# Patient Record
Sex: FEMALE | Race: BLACK OR AFRICAN AMERICAN | NOT HISPANIC OR LATINO | Employment: STUDENT | URBAN - METROPOLITAN AREA
[De-identification: names, ages, dates, MRNs, and addresses within clinical notes are randomized per-mention and may not be internally consistent; named-entity substitution may affect disease eponyms.]

---
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
## 2022-04-25 ENCOUNTER — HOSPITAL ENCOUNTER (EMERGENCY)
Facility: HOSPITAL | Age: 22
Discharge: HOME | End: 2022-04-25
Attending: EMERGENCY MEDICINE
Payer: MEDICAID

## 2022-04-25 VITALS
OXYGEN SATURATION: 98 % | SYSTOLIC BLOOD PRESSURE: 100 MMHG | HEIGHT: 60 IN | TEMPERATURE: 99 F | RESPIRATION RATE: 16 BRPM | DIASTOLIC BLOOD PRESSURE: 67 MMHG | BODY MASS INDEX: 29.45 KG/M2 | WEIGHT: 150 LBS | HEART RATE: 71 BPM

## 2022-04-25 DIAGNOSIS — R51.9 ACUTE NONINTRACTABLE HEADACHE, UNSPECIFIED HEADACHE TYPE: ICD-10-CM

## 2022-04-25 DIAGNOSIS — M54.50 ACUTE BILATERAL LOW BACK PAIN WITHOUT SCIATICA: Primary | ICD-10-CM

## 2022-04-25 DIAGNOSIS — R11.2 NON-INTRACTABLE VOMITING WITH NAUSEA, UNSPECIFIED VOMITING TYPE: ICD-10-CM

## 2022-04-25 LAB
B-HCG UR QL: NEGATIVE
BILIRUB UR QL STRIP: NEGATIVE
CLARITY UR: CLEAR
COLOR UR: YELLOW
CTP QC/QA: YES
GLUCOSE UR QL STRIP: NEGATIVE
HGB UR QL STRIP: NEGATIVE
KETONES UR QL STRIP: ABNORMAL
LEUKOCYTE ESTERASE UR QL STRIP: ABNORMAL
MICROSCOPIC COMMENT: ABNORMAL
NITRITE UR QL STRIP: NEGATIVE
PH UR STRIP: 7 [PH] (ref 5–8)
PROT UR QL STRIP: ABNORMAL
RBC #/AREA URNS HPF: 7 /HPF (ref 0–4)
SP GR UR STRIP: 1.02 (ref 1–1.03)
SQUAMOUS #/AREA URNS HPF: 6 /HPF
URN SPEC COLLECT METH UR: ABNORMAL
UROBILINOGEN UR STRIP-ACNC: NEGATIVE EU/DL
WBC #/AREA URNS HPF: 8 /HPF (ref 0–5)

## 2022-04-25 PROCEDURE — 81025 URINE PREGNANCY TEST: CPT | Performed by: EMERGENCY MEDICINE

## 2022-04-25 PROCEDURE — 99283 EMERGENCY DEPT VISIT LOW MDM: CPT | Mod: 25

## 2022-04-25 PROCEDURE — 87086 URINE CULTURE/COLONY COUNT: CPT | Performed by: EMERGENCY MEDICINE

## 2022-04-25 PROCEDURE — 25000003 PHARM REV CODE 250: Performed by: NURSE PRACTITIONER

## 2022-04-25 PROCEDURE — 81000 URINALYSIS NONAUTO W/SCOPE: CPT | Performed by: EMERGENCY MEDICINE

## 2022-04-25 RX ORDER — NAPROXEN 500 MG/1
500 TABLET ORAL EVERY 12 HOURS
Qty: 10 TABLET | Refills: 0 | Status: SHIPPED | OUTPATIENT
Start: 2022-04-25 | End: 2022-04-30

## 2022-04-25 RX ORDER — NAPROXEN 500 MG/1
500 TABLET ORAL
Status: COMPLETED | OUTPATIENT
Start: 2022-04-25 | End: 2022-04-25

## 2022-04-25 RX ADMIN — NAPROXEN 500 MG: 500 TABLET ORAL at 06:04

## 2022-04-25 NOTE — Clinical Note
"Demetra Bustamante" Angeli was seen and treated in our emergency department on 4/25/2022.  She may return to work on 04/27/2022.       If you have any questions or concerns, please don't hesitate to call.      MORGAN Bain LPN"

## 2022-04-25 NOTE — ED TRIAGE NOTES
Pt. Complains of N/V that started on Saturday. Pt. Also complains of lower back pain and urinary frequency. Pt. States that she is concerned that she may have a UTI. Pt. Rates her pain at a 5/10 on the pain scale.

## 2022-04-25 NOTE — ED PROVIDER NOTES
Encounter Date: 4/25/2022       History     Chief Complaint   Patient presents with    Exam     Pt presents to triage stating that she had low back pain and vomiting on Saturday.  Pt denies any pain or vomiting today but states she wants to know if she had a kidney infection.  Pt is a/o, skin w/d, resp easy.  No other concerns during triage.      CC:  Back pain and headache, vomiting    HPI: Demetra Suh, a 21 y.o. female presents to the ED for evaluation over concern for kidney infection.  She reports a 3 day history of bilateral lumbar back pain with a few episodes of vomiting and headache.  She describes the back pain is improving along with the vomiting resolved after 1 day.  She reports no fever.  No current nausea.  She reports a generalized mild gradual onset headache she reports she was concern for kidney infection because she has a family member that has kidney disease and she drinks lots of soft drinks.    There is no problem list on file for this patient.      The history is provided by the patient and a relative. No  was used.     Review of patient's allergies indicates:   Allergen Reactions    Pcn [penicillins] Hives     Past Medical History:   Diagnosis Date    Seizures     Last seizure at age 7 with fever, no history of epilepsy meds      Past Surgical History:   Procedure Laterality Date    ADENOIDECTOMY W/ MYRINGOTOMY AND TUBES       Family History   Problem Relation Age of Onset    BRCA 1/2 Maternal Grandmother 54    Ovarian cancer Other     BRCA 1/2 Other      Social History     Tobacco Use    Smoking status: Never Smoker    Smokeless tobacco: Never Used   Substance Use Topics    Alcohol use: No     Review of Systems   Constitutional: Negative for fever.   HENT: Negative for sore throat and trouble swallowing.    Cardiovascular: Negative for chest pain and leg swelling.   Gastrointestinal: Positive for nausea (Resolved) and vomiting ( resolved). Negative for  abdominal pain.   Genitourinary: Negative for decreased urine volume, enuresis, flank pain and urgency.   Musculoskeletal: Positive for back pain. Negative for neck pain and neck stiffness.   Skin: Negative for rash and wound.   Neurological: Positive for headaches. Negative for syncope and weakness.   Psychiatric/Behavioral: Negative for confusion.       Physical Exam     Initial Vitals [04/25/22 1710]   BP Pulse Resp Temp SpO2   122/72 78 16 98.5 °F (36.9 °C) 98 %      MAP       --         Physical Exam    Constitutional: She appears well-developed and well-nourished. She is not diaphoretic. She is cooperative.  Non-toxic appearance. She does not have a sickly appearance. She does not appear ill. No distress.   HENT:   Head: Normocephalic and atraumatic.   Right Ear: External ear normal.   Left Ear: External ear normal.   Nose: Nose normal.   Mouth/Throat: Mucous membranes are normal. No trismus in the jaw.   Neck: Phonation normal.   Normal range of motion.  Cardiovascular: Normal rate, regular rhythm and intact distal pulses.   Pulses:       Radial pulses are 2+ on the right side and 2+ on the left side.        Dorsalis pedis pulses are 2+ on the right side and 2+ on the left side.   Lower extremities symmetric in size, no calf tenderness, erythema, open wounds, increased warmth, swelling, or edema.    Abdominal: Abdomen is flat. There is no abdominal tenderness.   No right CVA tenderness.  No left CVA tenderness. There is no rebound and no guarding.   Musculoskeletal:      Cervical back: Normal range of motion.      Lumbar back: No tenderness or bony tenderness.     Neurological: She is alert and oriented to person, place, and time. She has normal strength. No sensory deficit. Coordination and gait normal. GCS eye subscore is 4. GCS verbal subscore is 5. GCS motor subscore is 6.   Skin: Skin is warm, dry and intact. Capillary refill takes less than 2 seconds. No bruising, no laceration and no rash noted. No  cyanosis or erythema.   Psychiatric: She has a normal mood and affect. Her speech is normal and behavior is normal. Judgment and thought content normal.         ED Course   Procedures  Labs Reviewed   URINALYSIS, REFLEX TO URINE CULTURE - Abnormal; Notable for the following components:       Result Value    Protein, UA Trace (*)     Ketones, UA 2+ (*)     Leukocytes, UA 1+ (*)     All other components within normal limits    Narrative:     Specimen Source->Urine   URINALYSIS MICROSCOPIC - Abnormal; Notable for the following components:    RBC, UA 7 (*)     WBC, UA 8 (*)     All other components within normal limits    Narrative:     Specimen Source->Urine   CULTURE, URINE   CULTURE, URINE   POCT URINE PREGNANCY          Imaging Results    None          Medications   naproxen tablet 500 mg (500 mg Oral Given 4/25/22 1853)           APC / Resident Notes:   This is an evaluation of a 21 y.o. female that presents to the Emergency Department for concern for possible kidney infection with 1 day of vomiting 3 days ago, improving lumbar back pain without injury, and headache.  Reports drinks lots of soft drinks. Physical Exam shows a non-toxic, afebrile, and well appearing female.  Heart and lung sounds normal.  Abdomen is soft and nontender.  There is no midline step-off or crepitus lumbar spine.  There is no tenderness of the lumbar muscular back.  There is no CVA tenderness.  Equal upper and lower extremity pulses.  Normal neuro exam. Vital signs are reassuring. If available, previous records reviewed. RESULTS:  UPT negative. UA with 1+ leukocytes, 7rbc's and 8wbcs. Patient is finishing up her cycle. Thought less likely UTI given no dysuria. Will culture    My overall impression is back pain, headache, and vomiting. I considered, but at this time, do not suspect pregnancy, ectopic pregnancy, lumbar fracture subluxation, cauda equina, acute stroke, intracranial hemorrhage, meningitis, pyelonephritis.    The diagnosis,  treatment plan, instructions for follow-up as well as ED return precautions were discussed. All questions have been answered.  I have counseled her on the importance of incorporating water and her daily diet and advised that soft drinks have significant amounts of sugar and be mindful of dietary choices. REGAN Dhillon, LAYLA-C                 Clinical Impression:   Final diagnoses:  [M54.50] Acute bilateral low back pain without sciatica (Primary)  [R51.9] Acute nonintractable headache, unspecified headache type  [R11.2] Non-intractable vomiting with nausea, unspecified vomiting type          ED Disposition Condition    Discharge Stable        ED Prescriptions     Medication Sig Dispense Start Date End Date Auth. Provider    naproxen (NAPROSYN) 500 MG tablet Take 1 tablet (500 mg total) by mouth every 12 (twelve) hours. Do not take any additional NSAIDs while you are taking this medication including (Advil, Aleve, Motrin, Ibuprofen, Mobic\meloxicam, Naprosyn, Toradol, ketoralac, etc.). for 5 days 10 tablet 4/25/2022 4/30/2022 LAYLA Perez        Follow-up Information     Follow up With Specialties Details Why Contact Info    Your Primary Care Doctor  Schedule an appointment as soon as possible for a visit  Please call and schedule an appointment for follow up this week.     [facility]  Schedule an appointment as soon as possible for a visit  For Follow-Up, This Week, If you do not have a Primary Care Doctor [address] [place] [address]  [phone]      [facility] Emergency Medicine Go to  If symptoms worsen [address] [facility] [id number] [phone]           LAYLA Perez  04/25/22 1959

## 2022-04-26 NOTE — DISCHARGE INSTRUCTIONS
§ Please return to the Emergency Department for any new or worsening symptoms including: fever, chest pain, shortness of breath, loss of consciousness, dizziness, weakness, or any other concerns.     § Schedule an appointment for follow up with your Primary Care Doctor as soon as possible for a recheck of your symptoms. If you do not have one, contact the one listed on your discharge paperwork or call the [facility] Appointment Desk at [phone] to schedule an appointment.     § Please take all medication as prescribed. You have been prescribed Naproxen for pain. This is an Non-Steroidal Anti-Inflammatory (NSAID) Medication. Please do not take any additional NSAIDs while you are taking this medication including (Advil, Aleve, Motrin, Ibuprofen, Mobic\meloxicam, Naprosyn, Toradol, ketoralac, etc.). Please stop taking this medication if you experience: weakness, itching, yellow skin or eyes, joint pains, vomiting blood, blood or black stools, unusual weight gain, or swelling in your arms, legs, hands, or feet.

## 2022-04-27 LAB — BACTERIA UR CULT: NORMAL

## 2022-07-13 ENCOUNTER — OFFICE VISIT (OUTPATIENT)
Dept: OBSTETRICS AND GYNECOLOGY | Facility: CLINIC | Age: 22
End: 2022-07-13
Payer: MEDICAID

## 2022-07-13 VITALS
HEIGHT: 60 IN | SYSTOLIC BLOOD PRESSURE: 128 MMHG | BODY MASS INDEX: 28.99 KG/M2 | WEIGHT: 147.69 LBS | DIASTOLIC BLOOD PRESSURE: 82 MMHG

## 2022-07-13 DIAGNOSIS — N94.6 DYSMENORRHEA: Primary | ICD-10-CM

## 2022-07-13 DIAGNOSIS — R11.0 NAUSEA: ICD-10-CM

## 2022-07-13 PROCEDURE — 3008F PR BODY MASS INDEX (BMI) DOCUMENTED: ICD-10-PCS | Mod: CPTII,,, | Performed by: NURSE PRACTITIONER

## 2022-07-13 PROCEDURE — 99999 PR PBB SHADOW E&M-EST. PATIENT-LVL III: ICD-10-PCS | Mod: PBBFAC,,, | Performed by: NURSE PRACTITIONER

## 2022-07-13 PROCEDURE — 99202 OFFICE O/P NEW SF 15 MIN: CPT | Mod: S$PBB,,, | Performed by: NURSE PRACTITIONER

## 2022-07-13 PROCEDURE — 99999 PR PBB SHADOW E&M-EST. PATIENT-LVL III: CPT | Mod: PBBFAC,,, | Performed by: NURSE PRACTITIONER

## 2022-07-13 PROCEDURE — 1159F MED LIST DOCD IN RCRD: CPT | Mod: CPTII,,, | Performed by: NURSE PRACTITIONER

## 2022-07-13 PROCEDURE — 3008F BODY MASS INDEX DOCD: CPT | Mod: CPTII,,, | Performed by: NURSE PRACTITIONER

## 2022-07-13 PROCEDURE — 99213 OFFICE O/P EST LOW 20 MIN: CPT | Mod: PBBFAC | Performed by: NURSE PRACTITIONER

## 2022-07-13 PROCEDURE — 3074F SYST BP LT 130 MM HG: CPT | Mod: CPTII,,, | Performed by: NURSE PRACTITIONER

## 2022-07-13 PROCEDURE — 3074F PR MOST RECENT SYSTOLIC BLOOD PRESSURE < 130 MM HG: ICD-10-PCS | Mod: CPTII,,, | Performed by: NURSE PRACTITIONER

## 2022-07-13 PROCEDURE — 3079F DIAST BP 80-89 MM HG: CPT | Mod: CPTII,,, | Performed by: NURSE PRACTITIONER

## 2022-07-13 PROCEDURE — 99202 PR OFFICE/OUTPT VISIT, NEW, LEVL II, 15-29 MIN: ICD-10-PCS | Mod: S$PBB,,, | Performed by: NURSE PRACTITIONER

## 2022-07-13 PROCEDURE — 1159F PR MEDICATION LIST DOCUMENTED IN MEDICAL RECORD: ICD-10-PCS | Mod: CPTII,,, | Performed by: NURSE PRACTITIONER

## 2022-07-13 PROCEDURE — 3079F PR MOST RECENT DIASTOLIC BLOOD PRESSURE 80-89 MM HG: ICD-10-PCS | Mod: CPTII,,, | Performed by: NURSE PRACTITIONER

## 2022-07-13 RX ORDER — NAPROXEN 250 MG/1
500 TABLET ORAL 2 TIMES DAILY WITH MEALS
Qty: 30 TABLET | Refills: 1 | Status: SHIPPED | OUTPATIENT
Start: 2022-07-13

## 2022-07-13 RX ORDER — ONDANSETRON 4 MG/1
8 TABLET, ORALLY DISINTEGRATING ORAL EVERY 6 HOURS PRN
Qty: 20 TABLET | Refills: 0 | Status: SHIPPED | OUTPATIENT
Start: 2022-07-13 | End: 2023-07-19 | Stop reason: SDUPTHER

## 2022-07-13 NOTE — PROGRESS NOTES
Demetra Suh is a 22 y.o. female  presents with painful periods. New to me- saw Dr. Villeda back in 2017 for this same complaint. She is not SA and has never been. She was on ocps in the past for dysmenorrhea but did not find that they helped. Last two periods have been very painful and a new symptom has been nausea on CD 2. She went to the ED for this complaint and was initially told it was a stomach bug, but then it happened again her next period. Her cramping is so bad she is in bed the first two days. The nausea is concerning- only vomited bile. She was given Zofran in the ER.     Past Medical History:   Diagnosis Date    Seizures     Last seizure at age 7 with fever, no history of epilepsy meds      Past Surgical History:   Procedure Laterality Date    ADENOIDECTOMY W/ MYRINGOTOMY AND TUBES       Social History     Tobacco Use    Smoking status: Never Smoker    Smokeless tobacco: Never Used   Substance Use Topics    Alcohol use: No    Drug use: Never     Family History   Problem Relation Age of Onset    BRCA 1/2 Maternal Grandmother 54    Ovarian cancer Other     BRCA 1/2 Other      OB History    Para Term  AB Living   0 0 0 0 0 0   SAB IAB Ectopic Multiple Live Births   0 0 0 0         ROS:  GENERAL: No fever, chills, fatigability or weight loss.  VULVAR: No pain, no lesions and no itching.  VAGINAL: No relaxation, no itching, no discharge, no abnormal bleeding and no lesions.  ABDOMEN: No abdominal pain. Denies nausea. Denies vomiting. No diarrhea. No constipation  BREAST: Denies pain. No lumps. No discharge.  URINARY: No incontinence, no nocturia, no frequency and no dysuria.  CARDIOVASCULAR: No chest pain. No shortness of breath. No leg cramps.  NEUROLOGICAL: No headaches. No vision changes.    PHYSICAL EXAM:  TALK ONLY    ASSESSMENT and PLAN:    ICD-10-CM ICD-9-CM    1. Dysmenorrhea  N94.6 625.3 ondansetron (ZOFRAN-ODT) 4 MG TbDL      naproxen (NAPROSYN) 250 MG tablet   2.  Nausea  R11.0 787.02 ondansetron (ZOFRAN-ODT) 4 MG TbDL         1. Pt chooses to try a high dose NSAID a few days prior to her period, she will notify me if no improvement next cycle and then we can try alternatives.   2. Rx Zofran today    15 minutes spent face to face with patient

## 2023-07-19 ENCOUNTER — TELEPHONE (OUTPATIENT)
Dept: OBSTETRICS AND GYNECOLOGY | Facility: CLINIC | Age: 23
End: 2023-07-19
Payer: MEDICAID

## 2023-07-19 ENCOUNTER — OFFICE VISIT (OUTPATIENT)
Dept: OBSTETRICS AND GYNECOLOGY | Facility: CLINIC | Age: 23
End: 2023-07-19
Payer: MEDICAID

## 2023-07-19 VITALS
HEIGHT: 60 IN | SYSTOLIC BLOOD PRESSURE: 114 MMHG | BODY MASS INDEX: 27.74 KG/M2 | WEIGHT: 141.31 LBS | DIASTOLIC BLOOD PRESSURE: 68 MMHG

## 2023-07-19 DIAGNOSIS — R11.0 NAUSEA: ICD-10-CM

## 2023-07-19 DIAGNOSIS — N94.6 DYSMENORRHEA: Primary | ICD-10-CM

## 2023-07-19 PROCEDURE — 3074F PR MOST RECENT SYSTOLIC BLOOD PRESSURE < 130 MM HG: ICD-10-PCS | Mod: CPTII,,, | Performed by: ADVANCED PRACTICE MIDWIFE

## 2023-07-19 PROCEDURE — 3008F PR BODY MASS INDEX (BMI) DOCUMENTED: ICD-10-PCS | Mod: CPTII,,, | Performed by: ADVANCED PRACTICE MIDWIFE

## 2023-07-19 PROCEDURE — 1159F MED LIST DOCD IN RCRD: CPT | Mod: CPTII,,, | Performed by: ADVANCED PRACTICE MIDWIFE

## 2023-07-19 PROCEDURE — 3074F SYST BP LT 130 MM HG: CPT | Mod: CPTII,,, | Performed by: ADVANCED PRACTICE MIDWIFE

## 2023-07-19 PROCEDURE — 3078F DIAST BP <80 MM HG: CPT | Mod: CPTII,,, | Performed by: ADVANCED PRACTICE MIDWIFE

## 2023-07-19 PROCEDURE — 3008F BODY MASS INDEX DOCD: CPT | Mod: CPTII,,, | Performed by: ADVANCED PRACTICE MIDWIFE

## 2023-07-19 PROCEDURE — 1159F PR MEDICATION LIST DOCUMENTED IN MEDICAL RECORD: ICD-10-PCS | Mod: CPTII,,, | Performed by: ADVANCED PRACTICE MIDWIFE

## 2023-07-19 PROCEDURE — 99999 PR PBB SHADOW E&M-EST. PATIENT-LVL III: CPT | Mod: PBBFAC,,, | Performed by: ADVANCED PRACTICE MIDWIFE

## 2023-07-19 PROCEDURE — 3078F PR MOST RECENT DIASTOLIC BLOOD PRESSURE < 80 MM HG: ICD-10-PCS | Mod: CPTII,,, | Performed by: ADVANCED PRACTICE MIDWIFE

## 2023-07-19 PROCEDURE — 99213 PR OFFICE/OUTPT VISIT, EST, LEVL III, 20-29 MIN: ICD-10-PCS | Mod: S$PBB,,, | Performed by: ADVANCED PRACTICE MIDWIFE

## 2023-07-19 PROCEDURE — 99999 PR PBB SHADOW E&M-EST. PATIENT-LVL III: ICD-10-PCS | Mod: PBBFAC,,, | Performed by: ADVANCED PRACTICE MIDWIFE

## 2023-07-19 PROCEDURE — 99213 OFFICE O/P EST LOW 20 MIN: CPT | Mod: S$PBB,,, | Performed by: ADVANCED PRACTICE MIDWIFE

## 2023-07-19 PROCEDURE — 99213 OFFICE O/P EST LOW 20 MIN: CPT | Mod: PBBFAC,PN | Performed by: ADVANCED PRACTICE MIDWIFE

## 2023-07-19 RX ORDER — NORETHINDRONE ACETATE AND ETHINYL ESTRADIOL 1MG-20(21)
1 KIT ORAL
COMMUNITY
Start: 2023-06-29 | End: 2023-07-19

## 2023-07-19 RX ORDER — ONDANSETRON 4 MG/1
8 TABLET, ORALLY DISINTEGRATING ORAL EVERY 6 HOURS PRN
Qty: 20 TABLET | Refills: 3 | Status: SHIPPED | OUTPATIENT
Start: 2023-07-19

## 2023-07-19 RX ORDER — NORETHINDRONE ACETATE AND ETHINYL ESTRADIOL 1.5-30(21)
1 KIT ORAL DAILY
Qty: 28 TABLET | Refills: 12 | Status: SHIPPED | OUTPATIENT
Start: 2023-07-19 | End: 2024-07-17

## 2023-07-19 RX ORDER — IBUPROFEN 600 MG/1
600 TABLET ORAL EVERY 6 HOURS PRN
Qty: 42 TABLET | Refills: 2 | Status: SHIPPED | OUTPATIENT
Start: 2023-07-19 | End: 2023-08-28

## 2023-07-19 NOTE — PROGRESS NOTES
Past medical, surgical, social, family, and obstetric histories; medications; prior records and results; and available outside records were reviewed and updated in the EMR.  Pertinent findings were noted below.    Reason for Visit   Dysmenorrhea    HPI   23 y.o. female  who presents for nausea and vomiting during the first days of her period. She reports that she first got her period at age 14. Since then she would have some cramping but otherwise normal periods. Approximately one year ago the patient started having severe nausea and some vomiting during Day 2 of her period. The patient reports her period occur once a month lasting for 7 days with moderate flow. She was evaluated recently and was started on OCPs by another provider. She is currently on the second pack of pills. She notes that her menstrual cycle is not following the pill schedule (ie. bleeding for 2 weeks on active pills). She also notes that her symptoms have not improved.    Unrelated, the patient endorses severe pain and discomfort with pelvic exams and TVUS. She endorses being unable to tolerate anything being inserted into her vagina. She has never been sexually active and does not plan on it in the near future.     Patient's last menstrual period was 2023.    Exam   /68 (BP Location: Left arm, Patient Position: Sitting)   Ht 5' (1.524 m)   Wt 64.1 kg (141 lb 5 oz)   LMP 2023   BMI 27.60 kg/m²     Physical Exam  Constitutional:       Appearance: Normal appearance.   HENT:      Head: Normocephalic.      Nose: Nose normal.   Eyes:      Extraocular Movements: Extraocular movements intact.      Pupils: Pupils are equal, round, and reactive to light.   Pulmonary:      Effort: Pulmonary effort is normal. No respiratory distress.   Musculoskeletal:         General: Normal range of motion.      Cervical back: Normal range of motion.   Neurological:      General: No focal deficit present.      Mental Status: She is alert and  oriented to person, place, and time.   Psychiatric:         Mood and Affect: Mood normal.         Behavior: Behavior normal.         Thought Content: Thought content normal.         Judgment: Judgment normal.     Assessment and Plan   Dysmenorrhea  -     ibuprofen (ADVIL,MOTRIN) 600 MG tablet; Take 1 tablet (600 mg total) by mouth every 6 (six) hours as needed for Pain.  Dispense: 42 tablet; Refill: 2  -     ondansetron (ZOFRAN-ODT) 4 MG TbDL; Take 2 tablets (8 mg total) by mouth every 6 (six) hours as needed (nausea).  Dispense: 20 tablet; Refill: 3    Nausea  -     ondansetron (ZOFRAN-ODT) 4 MG TbDL; Take 2 tablets (8 mg total) by mouth every 6 (six) hours as needed (nausea).  Dispense: 20 tablet; Refill: 3    Other orders  -     norethindrone-ethinyl estradiol-iron (MICROGESTIN FE1.5/30) 1.5 mg-30 mcg (21)/75 mg (7) tablet; Take 1 tablet by mouth once daily.  Dispense: 28 tablet; Refill: 12    Discussed that OCPs are appropriate mgmt for dysmenorrhea. The patient is currently on the third week of her second pack. Patient instructed to finish the current pack and transition to the new pack (sent to pharmacy). Higher dose of OCPs sent to pharmacy.  Will follow-up in 2 months. Appointment scheduled.   The patient would like to delay evaluation and or mgmt of the pelvic discomfort at this time. She would like to focus on her nausea and vomiting with periods. Briefly discussed options of pelvic floor therapy with the patient. Patient expressed understanding.     Pedro Luis Kohler MD PGY-2  Obstetrics and Gynecology    I have seen the patient, reviewed the Resident's assessment, plan, and progress note. I have personally interviewed and examined the patient at bedside and: agree with the findings.     Shayna Donald CNM  Obstetrics

## 2023-07-19 NOTE — LETTER
July 19, 2023      Morgan shoshana Childs, OBGYN  [address], [address]  [facility] [address]  Phone: [phone]  Fax: [phone]       Patient: Demetra Suh   YOB: 2000  Date of Visit: 07/19/2023    To Whom It May Concern:    Valerie Suh  was at [facility] on 07/19/2023. The patient may return to work on 07/20/2023 with no restrictions. If you have any questions or concerns, or if I can be of further assistance, please do not hesitate to contact me.    Sincerely,        Shayna Donald CNM

## 2023-07-24 ENCOUNTER — PATIENT MESSAGE (OUTPATIENT)
Dept: OBSTETRICS AND GYNECOLOGY | Facility: CLINIC | Age: 23
End: 2023-07-24
Payer: MEDICAID

## 2023-08-17 ENCOUNTER — PATIENT MESSAGE (OUTPATIENT)
Dept: OBSTETRICS AND GYNECOLOGY | Facility: CLINIC | Age: 23
End: 2023-08-17
Payer: MEDICAID

## 2023-09-29 ENCOUNTER — OFFICE VISIT (OUTPATIENT)
Dept: OBSTETRICS AND GYNECOLOGY | Facility: CLINIC | Age: 23
End: 2023-09-29
Payer: MEDICAID

## 2023-09-29 VITALS — DIASTOLIC BLOOD PRESSURE: 60 MMHG | SYSTOLIC BLOOD PRESSURE: 98 MMHG | BODY MASS INDEX: 27.08 KG/M2 | WEIGHT: 138.69 LBS

## 2023-09-29 DIAGNOSIS — Z30.09 ENCOUNTER FOR OTHER GENERAL COUNSELING OR ADVICE ON CONTRACEPTION: Primary | ICD-10-CM

## 2023-09-29 PROCEDURE — 99499 UNLISTED E&M SERVICE: CPT | Mod: S$PBB,,, | Performed by: ADVANCED PRACTICE MIDWIFE

## 2023-09-29 PROCEDURE — 99499 NO LOS: ICD-10-PCS | Mod: S$PBB,,, | Performed by: ADVANCED PRACTICE MIDWIFE

## 2023-09-29 PROCEDURE — 99999 PR PBB SHADOW E&M-EST. PATIENT-LVL II: ICD-10-PCS | Mod: PBBFAC,,, | Performed by: ADVANCED PRACTICE MIDWIFE

## 2023-09-29 PROCEDURE — 99212 OFFICE O/P EST SF 10 MIN: CPT | Mod: PBBFAC,PN | Performed by: ADVANCED PRACTICE MIDWIFE

## 2023-09-29 PROCEDURE — 99999 PR PBB SHADOW E&M-EST. PATIENT-LVL II: CPT | Mod: PBBFAC,,, | Performed by: ADVANCED PRACTICE MIDWIFE

## 2023-09-29 NOTE — LETTER
September 29, 2023      Allison anna [place], OBGYN  [address] [facility] [address]  [facility] [address]  Phone: [phone]  Fax: [phone]       Patient: Demetra Suh   YOB: 2000  Date of Visit: 09/29/2023    To Whom It May Concern:    Valerie Suh  was at [facility] on 09/29/2023. The patient may return to school on 10/02/2023 with no restrictions. If you have any questions or concerns, or if I can be of further assistance, please do not hesitate to contact me.    Sincerely,      ADRI Oates MA

## 2023-09-30 NOTE — PROGRESS NOTES
Pt in for follow up after initial rx of birth control pills. Pt reports all going well- no complaints.

## 2024-04-24 ENCOUNTER — PATIENT MESSAGE (OUTPATIENT)
Dept: OBSTETRICS AND GYNECOLOGY | Facility: CLINIC | Age: 24
End: 2024-04-24
Payer: MEDICAID

## 2024-04-24 DIAGNOSIS — R11.15 CYCLICAL VOMITING WITH NAUSEA: Primary | ICD-10-CM

## 2024-04-24 RX ORDER — ONDANSETRON 8 MG/1
8 TABLET, ORALLY DISINTEGRATING ORAL EVERY 12 HOURS PRN
Qty: 20 TABLET | Refills: 2 | Status: SHIPPED | OUTPATIENT
Start: 2024-04-24

## 2024-04-29 ENCOUNTER — PATIENT MESSAGE (OUTPATIENT)
Dept: OBSTETRICS AND GYNECOLOGY | Facility: CLINIC | Age: 24
End: 2024-04-29
Payer: MEDICAID

## 2024-06-25 ENCOUNTER — TELEPHONE (OUTPATIENT)
Dept: OBSTETRICS AND GYNECOLOGY | Facility: CLINIC | Age: 24
End: 2024-06-25
Payer: MEDICAID

## 2024-06-25 DIAGNOSIS — Z30.9 ENCOUNTER FOR CONTRACEPTIVE MANAGEMENT, UNSPECIFIED TYPE: Primary | ICD-10-CM

## 2024-06-25 RX ORDER — NORETHINDRONE ACETATE AND ETHINYL ESTRADIOL .02; 1 MG/1; MG/1
1 TABLET ORAL DAILY
Qty: 30 TABLET | Refills: 11 | Status: SHIPPED | OUTPATIENT
Start: 2024-06-25 | End: 2025-06-25

## 2024-06-27 NOTE — TELEPHONE ENCOUNTER
Refill Routing Note   Medication(s) are not appropriate for processing by [facility] for the following reason(s):        Patient not seen by provider within 15 months  No active prescription written by provider    [facility] action(s):  Defer               Appointments  past 12m or future 3m with PCP    Date Provider   Last Visit   Visit date not found Leela Velazquez MD   Next Visit   Visit date not found Leela Velazquez MD   ED visits in past 90 days: 0        Note composed:10:17 AM 06/27/2024

## 2024-06-28 RX ORDER — NORETHINDRONE ACETATE AND ETHINYL ESTRADIOL, AND FERROUS FUMARATE 1.5-30(21)
1 KIT ORAL
Qty: 84 TABLET | Refills: 0 | Status: SHIPPED | OUTPATIENT
Start: 2024-06-28